# Patient Record
Sex: MALE | Race: WHITE
[De-identification: names, ages, dates, MRNs, and addresses within clinical notes are randomized per-mention and may not be internally consistent; named-entity substitution may affect disease eponyms.]

---

## 2018-01-27 ENCOUNTER — HOSPITAL ENCOUNTER (EMERGENCY)
Dept: HOSPITAL 24 - ER | Age: 63
Discharge: HOME | End: 2018-01-27
Payer: COMMERCIAL

## 2018-01-27 VITALS — BODY MASS INDEX: 4003.2 KG/M2

## 2018-01-27 VITALS — DIASTOLIC BLOOD PRESSURE: 82 MMHG | SYSTOLIC BLOOD PRESSURE: 110 MMHG

## 2018-01-27 DIAGNOSIS — J01.80: Primary | ICD-10-CM

## 2018-01-27 DIAGNOSIS — R51: ICD-10-CM

## 2018-01-27 PROCEDURE — 87502 INFLUENZA DNA AMP PROBE: CPT

## 2018-01-27 PROCEDURE — 96372 THER/PROPH/DIAG INJ SC/IM: CPT

## 2018-01-27 PROCEDURE — 99282 EMERGENCY DEPT VISIT SF MDM: CPT

## 2018-01-27 NOTE — DR.FEVERAD
HPI





- Time seen


Time seen: 10:50





- PCP


Primary Care Physician: CHLOE METCALF





- HPI Comment


HPI Comment: PATIENT HAVE HEADACHE, POST NASAL DRIP AND NASAL CONGESTION. WORSE 

TODAY. WEAKNESS AND FATIGUE ALSO.





- Complaints/Symptoms


Chief Complaint Doctor Comments: COUGH, COLD CONGESTION TIME 2 TO 3 DAYS.


Chief Complaint:: PT C/O ON THURSDAY HE STARTED HAVING FEVER, CCC, AND ACHING 

ALL OVER .


Self Treatment fo Chief Complaint: TYLENOL, SINUS MEDS OTC





- Nurses notes reviewed


Nurses Notes Review: Yes





- Source


History Provided: Patient, Family Member





- Mode of Arrival


Mode of Arrival: Ambulatory





- Timing


Onset of Chief Complaint: 01/25/18


Came on: Suddenly





- Duration


Duration: Constant


Duration: Days





- Context


Recent: None


Symptoms: Cough, Nasal symptoms


History of: None





- Modifying factors


Modifying factors: Nothing





- Associated signs and symptoms


Associated signs and symptoms: None





PMH





- PMH


Past Medical History: Yes


Past Medical History: Arthritis


Past Medical History Comment: AFIB.


Past Surgical History: Yes


Surgical History: Ortho Surgery


Past Surgical History Comment: STENT, PACEMAKER, ABLASION,





- Family History


History of Family Medical Conditions: Yes


Family Medical History: Coronary Artery Disease, Heart Failure





- Social History


Does patient currently use any type of tobacco product: No


Have you used tobacco products in the last 12 months: No


Type of Tobacco Use: None


Does any household member use tobacco: No


Alcohol Use: None


Do you use any recreational Drugs:: No


Lives With: Family


Lives Where: Home





- infectious screening


In the last 2 months have you had wt loss of >10#?: NO


Have you had fever, night sweats or hemotysis?: No


Have you traveled outside the country in the last 6 months?: No


Isolation: Standard





ROS





- Review of Systems


Constitutional: Weakness, Fatigue


Eyes: No Symptoms Reported.  negative: Eye Pain, Discharge


ENTM: Ear Pain, Nose Discharge, Nose Congestion, Throat Pain


Respiratoy: Moist Cough.  negative: Short of Breath, Wheezing, Hemoptysis


Cardiovascular: negative: Chest Pain


Gastrointestinal/Abdominal: negative: Abdominal Pain, Diarrhea, Nausea, Vomiting


Genitourinary: No Symptoms Reported.  negative: Dysuria, Frequency, Hematuria


Neurological: Headache, Weakness, Dizziness


Musculoskeletal: No Symptoms Reported


Integumentary: No Symptoms Reported


Hematologic/Lymphatic: No Symptoms Reported


Endocrine: No Symptoms Reported


All Other Systems: Reviewed and Negative





PE





- Vital Signs


Vitals: 


 





Temperature                      97.7 F


Pulse Rate                       67


Respiratory Rate                 18


Blood Pressure [Right Arm]       109/74


Blood Pressure                   110/82


O2 Sat by Pulse Oximetry         96











- General


Limitations: No Limitations


General Appearance: Alert





- Head


Head Exam: Normal Inspection





- Eyes


Eye exam: Normal Appearance





- ENT


ENT Exam: Normal  External Ear Exam


External Ear Exam: Normal External Inspection


TM/Canal Exam: Bilateral Bulging


Nose Exam: Sinus Tenderness


Mouth Exam: Normal Inspection


Throat Exam: Tonsillar Erythema.  negative: Tonsillomegaly, Tonsillar Exudate





- Neck


Neck Exam: Trachea Midline





- Respiratory


Respiratory Exam: Normal Lung Sounds Bilat


Respiratory Exam: Bilateral Clear to Auscultation





- Cardiovascular


Cardiovascular Exam: Regular Rate, Normal Rhythm, Normal Heart Sounds





- Abdominal Exam


Abdominal Exam: Normal Bowel Sounds, Soft.  negative: Tenderness





- Extremities


Extremities Exam: Normal Inspection





- Back


Back Exam: Normal Inspection





- Neurologic


Neurological Exam: Alert, Oriented X3





- Psychiatric


Psychiatric Exam: Normal Affect, Normal Mood





- Skin


Skin Exam: Normal Color





MDM





- Differential Diagnosis


Differential Diagnosis: Dehydration, Influenza, Viral syndrome (SINUSITIS, URI, 

SINUS HEADACHE)





Course





- Treatment


Treatment: SEE ORDERS.





- Education/Counseling


Education/Counseling: Patient, Family, Education


Educated On: Diagnosis, Needs for Follow Up





ROR





- Labs Reviewed


Laboratory Results Reviewed?: Yes


Laboratory: 


 











Influenza Type A (PCR)  Negative  (NEGATIVE)   01/27/18  10:49    


 


Influenza Type B (PCR)  Negative  (NEGATIVE)   01/27/18  10:49    














- Diagnosis


Discharge Problem: 


 Acute sinusitis, Sinus headache








- Discharge Plan


Disposition: 01 HOME, SELF-CARE


Condition: Stable


Prescriptions: 


Acetaminophen with Codeine [Tylenol/Codeine #3 300-30 mg] 1 tab PO Q6H PRN #15 

tab


 PRN Reason: Pain


Amoxicillin & Pot Clavulanate [AUGMENTIN  mg/125 mg *] 1 tab PO BID #20 

tab


Cetirizine HCl [Zyrtec Tab 10 mg] 10 mg PO DAILY #30 tab





- Follow ups/Referrals


Follow ups/Referrals: 


Rudi Shukla [Primary Care Provider] - 3 days





- Instructions


Instructions:  Sinusitis, Adult, Easy-to-Read, Sinus Headache


Additional Instructions: 


RETURN TO ED IF WORSE.